# Patient Record
Sex: MALE | Race: WHITE | NOT HISPANIC OR LATINO | Employment: FULL TIME | URBAN - NONMETROPOLITAN AREA
[De-identification: names, ages, dates, MRNs, and addresses within clinical notes are randomized per-mention and may not be internally consistent; named-entity substitution may affect disease eponyms.]

---

## 2024-03-19 ENCOUNTER — APPOINTMENT (OUTPATIENT)
Dept: PRIMARY CARE | Facility: CLINIC | Age: 59
End: 2024-03-19

## 2024-08-20 ENCOUNTER — APPOINTMENT (OUTPATIENT)
Dept: PRIMARY CARE | Facility: CLINIC | Age: 59
End: 2024-08-20

## 2024-08-20 VITALS
WEIGHT: 278 LBS | OXYGEN SATURATION: 96 % | DIASTOLIC BLOOD PRESSURE: 84 MMHG | HEIGHT: 72 IN | HEART RATE: 95 BPM | SYSTOLIC BLOOD PRESSURE: 126 MMHG | BODY MASS INDEX: 37.65 KG/M2

## 2024-08-20 DIAGNOSIS — K42.9 UMBILICAL HERNIA WITHOUT OBSTRUCTION AND WITHOUT GANGRENE: ICD-10-CM

## 2024-08-20 DIAGNOSIS — Z13.220 LIPID SCREENING: ICD-10-CM

## 2024-08-20 DIAGNOSIS — Z13.6 SCREENING FOR HEART DISEASE: ICD-10-CM

## 2024-08-20 DIAGNOSIS — C43.59 MALIGNANT MELANOMA OF TORSO EXCLUDING BREAST (MULTI): Primary | ICD-10-CM

## 2024-08-20 DIAGNOSIS — Z12.5 SCREENING FOR PROSTATE CANCER: ICD-10-CM

## 2024-08-20 PROCEDURE — 99214 OFFICE O/P EST MOD 30 MIN: CPT | Performed by: INTERNAL MEDICINE

## 2024-08-20 PROCEDURE — 3008F BODY MASS INDEX DOCD: CPT | Performed by: INTERNAL MEDICINE

## 2024-08-20 ASSESSMENT — ENCOUNTER SYMPTOMS
ABDOMINAL PAIN: 0
BLOOD IN STOOL: 0
COUGH: 0
WHEEZING: 0
PALPITATIONS: 0
DIARRHEA: 0
BACK PAIN: 0
VOMITING: 0
ARTHRALGIAS: 0
SHORTNESS OF BREATH: 0
FATIGUE: 0
NAUSEA: 0

## 2024-08-20 ASSESSMENT — PATIENT HEALTH QUESTIONNAIRE - PHQ9
2. FEELING DOWN, DEPRESSED OR HOPELESS: NOT AT ALL
SUM OF ALL RESPONSES TO PHQ9 QUESTIONS 1 AND 2: 0
1. LITTLE INTEREST OR PLEASURE IN DOING THINGS: NOT AT ALL

## 2024-08-20 NOTE — ASSESSMENT & PLAN NOTE
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

## 2024-08-20 NOTE — ASSESSMENT & PLAN NOTE
-It is rather sizable but does not cause him symptoms at this time.  He has expressed a desire to have it repaired and we talked about making a referral after his next follow-up visit.  -He had a CAT scan of his abdomen back in February 2022

## 2024-08-20 NOTE — PROGRESS NOTES
Mr. Abreu had a prior diagnosis of stage IIIC melanoma of left upper back. He noted a lesion on his left shoulder about 2 years back which eventually started itching and bleeding when he sought treatment. He was seen in the ER and referred to dermatology. Biopsy on 1/20/2022 showed melanoma, thickness 4.1 mm, with ulceration. He underwent WLE and SLNB on 1/27/22, which showed metastatic melanoma involving 1 sentinel lymph node with largest metastatic deposit measuring 7.5 mm. Subjective   Patient ID: Héctor Abreu is a 59 y.o. male who presents for [facility].  HPI  He is here today to get established and he explains that he is here today because of his wife.  She wanted him to get a checkup.  He states he has been feeling fine and has not seen a primary care doctor for some time.  Unfortunately a couple years ago he was diagnosed with a stage IIIc melanoma.  He was biopsied back on January 20 and then had a WLE and SLNB on January 27, 2022.  He comes in today with a scar in his back that he states has been feeling fine but he has not been back for any checkups with oncology or dermatology.  I explained to him that I am glad he did well with his treatment but it is important that he continue with surveillance exams and I am going to refer him to oncology for a follow-up visit.  He is agreeable.  We also conducted a full review of systems and for the most part he has been feeling fine.  He does have a rather sizable protruding umbilical hernia.  He states it never bothers him unless somebody inadvertently hits it.  I also went through some scans from a couple years ago.  He did have some nodules in the lung.  We decided to do a coronary calcium scan and that way we can check for heart disease as well as check his lungs.  He states has been many years since his cholesterol was checked so we will do that as well and some cursory lab work based on his weight.  I also gave him a fecal occult blood test kit today.   We talked about colonoscopy and Cologuard and he would like to do the FOBT.  He has agreed to complete it and I will bring it back at his earliest convenience.  We will also be checking a PSA for prostate cancer screening.  Review of Systems   Constitutional:  Negative for fatigue.   Respiratory:  Negative for cough, shortness of breath and wheezing.    Cardiovascular:  Negative for chest pain, palpitations and leg swelling.   Gastrointestinal:  Negative for abdominal pain, blood in stool, diarrhea, nausea and vomiting.   Musculoskeletal:  Negative for arthralgias and back pain.     Objective   Physical Exam  Vitals and nursing note reviewed.   Constitutional:       General: He is not in acute distress.     Appearance: Normal appearance.   HENT:      Head: Normocephalic and atraumatic.   Eyes:      Conjunctiva/sclera: Conjunctivae normal.   Cardiovascular:      Rate and Rhythm: Normal rate and regular rhythm.      Heart sounds: Normal heart sounds.   Pulmonary:      Effort: No respiratory distress.      Breath sounds: No wheezing.   Abdominal:      Palpations: Abdomen is soft.      Tenderness: There is no abdominal tenderness. There is no guarding.   Musculoskeletal:         General: No swelling. Normal range of motion.   Skin:     General: Skin is warm and dry.   Neurological:      General: No focal deficit present.      Mental Status: He is alert and oriented to person, place, and time.   Psychiatric:         Behavior: Behavior normal.       Assessment/Plan   Problem List Items Addressed This Visit             ICD-10-CM    BMI 37.0-37.9, adult Z68.37     -We will be screening his blood glucose and liver tests         Relevant Orders    Comprehensive Metabolic Panel    Screening for heart disease Z13.6     -I am sending him for a coronary calcium scan and we will see him back to go over the results         Relevant Orders    CT cardiac scoring wo IV contrast    Umbilical hernia without obstruction and without  gangrene K42.9     -It is rather sizable but does not cause him symptoms at this time.  He has expressed a desire to have it repaired and we talked about making a referral after his next follow-up visit.  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  We typically like 12 hours so for example if you decided to go to the lab at 8 AM you would simply cut off anything with calories after 8 PM the night before.  You can have water and in fact we encourage you to drink plenty of water for your exam.  Please try to get that done at your earliest convenience so that when you come back we can go over the results  I am having the staff refer you to oncology locally because of your history of melanoma.  We will likely refer you to surgery for your hernia but we decided we would wait until your follow-up visit  Please remember to complete the FOBT kit and what I suggest is you put the kit in the bathroom so that you will not forget.  You can have anybody drop it off at our front window or you can drop it off in the morning at 7 AM.  We discussed your use of snuff and please keep in mind this does increase your risk of oral facial cancers so try to think about wanting to quit and we can help you in any way we can.       Syeda Andrews, DO

## 2024-08-20 NOTE — PATIENT INSTRUCTIONS
As we discussed I have ordered lab work and I would like for you to go on a fasting state.  We typically like 12 hours so for example if you decided to go to the lab at 8 AM you would simply cut off anything with calories after 8 PM the night before.  You can have water and in fact we encourage you to drink plenty of water for your exam.  Please try to get that done at your earliest convenience so that when you come back we can go over the results  I am having the staff refer you to oncology locally because of your history of melanoma.  We will likely refer you to surgery for your hernia but we decided we would wait until your follow-up visit  Please remember to complete the FOBT kit and what I suggest is you put the kit in the bathroom so that you will not forget.  You can have anybody drop it off at our front window or you can drop it off in the morning at 7 AM.  We discussed your use of snuff and please keep in mind this does increase your risk of oral facial cancers so try to think about wanting to quit and we can help you in any way we can.

## 2024-09-16 ENCOUNTER — APPOINTMENT (OUTPATIENT)
Dept: RADIOLOGY | Facility: HOSPITAL | Age: 59
End: 2024-09-16
Payer: COMMERCIAL

## 2024-09-19 ENCOUNTER — APPOINTMENT (OUTPATIENT)
Age: 59
End: 2024-09-19
Payer: COMMERCIAL

## 2024-09-20 ENCOUNTER — APPOINTMENT (OUTPATIENT)
Dept: HEMATOLOGY/ONCOLOGY | Facility: CLINIC | Age: 59
End: 2024-09-20
Payer: COMMERCIAL

## 2024-11-19 ENCOUNTER — TELEPHONE (OUTPATIENT)
Age: 59
End: 2024-11-19
Payer: COMMERCIAL

## 2024-11-19 NOTE — TELEPHONE ENCOUNTER
I think that the last thing would be to send a certified letter and that way you have done due diligence.  Thank you

## 2024-11-19 NOTE — TELEPHONE ENCOUNTER
Copied from CRM #[id number]. Topic: Information Request - Get Appointment Information  >> Nov 19, 2024 10:30 AM Shruthi ANDREWS wrote:  Hello our sched dept has reached out to pt 5x with no response in scheduling for HEM/ONC.   How would you like to proceed?   Thank you